# Patient Record
Sex: FEMALE | Race: WHITE | NOT HISPANIC OR LATINO | Employment: PART TIME | URBAN - METROPOLITAN AREA
[De-identification: names, ages, dates, MRNs, and addresses within clinical notes are randomized per-mention and may not be internally consistent; named-entity substitution may affect disease eponyms.]

---

## 2023-06-18 ENCOUNTER — OFFICE VISIT (OUTPATIENT)
Dept: URGENT CARE | Facility: URGENT CARE | Age: 43
End: 2023-06-18
Payer: COMMERCIAL

## 2023-06-18 VITALS
RESPIRATION RATE: 16 BRPM | HEIGHT: 64 IN | HEART RATE: 88 BPM | BODY MASS INDEX: 23.9 KG/M2 | WEIGHT: 140 LBS | TEMPERATURE: 98.4 F | SYSTOLIC BLOOD PRESSURE: 120 MMHG | OXYGEN SATURATION: 100 % | DIASTOLIC BLOOD PRESSURE: 76 MMHG

## 2023-06-18 DIAGNOSIS — N30.01 ACUTE CYSTITIS WITH HEMATURIA: Primary | ICD-10-CM

## 2023-06-18 DIAGNOSIS — B96.89 BACTERIAL VAGINOSIS: ICD-10-CM

## 2023-06-18 DIAGNOSIS — N76.0 BACTERIAL VAGINOSIS: ICD-10-CM

## 2023-06-18 DIAGNOSIS — R10.31 RLQ ABDOMINAL PAIN: ICD-10-CM

## 2023-06-18 DIAGNOSIS — R30.0 DYSURIA: ICD-10-CM

## 2023-06-18 DIAGNOSIS — N89.8 VAGINAL DISCHARGE: ICD-10-CM

## 2023-06-18 LAB
ALBUMIN UR-MCNC: >=300 MG/DL
APPEARANCE UR: ABNORMAL
BILIRUB UR QL STRIP: NEGATIVE
CLUE CELLS: PRESENT
COLOR UR AUTO: YELLOW
GLUCOSE UR STRIP-MCNC: NEGATIVE MG/DL
HGB UR QL STRIP: ABNORMAL
KETONES UR STRIP-MCNC: NEGATIVE MG/DL
LEUKOCYTE ESTERASE UR QL STRIP: ABNORMAL
NITRATE UR QL: NEGATIVE
PH UR STRIP: 7 [PH] (ref 5–7)
RBC #/AREA URNS AUTO: ABNORMAL /HPF
SP GR UR STRIP: 1.02 (ref 1–1.03)
TRICHOMONAS, WET PREP: ABNORMAL
UROBILINOGEN UR STRIP-ACNC: 1 E.U./DL
WBC #/AREA URNS AUTO: ABNORMAL /HPF
WBC CLUMPS #/AREA URNS HPF: PRESENT /HPF
WBC'S/HIGH POWER FIELD, WET PREP: ABNORMAL
YEAST, WET PREP: ABNORMAL

## 2023-06-18 PROCEDURE — 99204 OFFICE O/P NEW MOD 45 MIN: CPT | Performed by: FAMILY MEDICINE

## 2023-06-18 PROCEDURE — 87086 URINE CULTURE/COLONY COUNT: CPT | Performed by: FAMILY MEDICINE

## 2023-06-18 PROCEDURE — 81001 URINALYSIS AUTO W/SCOPE: CPT

## 2023-06-18 PROCEDURE — 87491 CHLMYD TRACH DNA AMP PROBE: CPT | Performed by: FAMILY MEDICINE

## 2023-06-18 PROCEDURE — 87210 SMEAR WET MOUNT SALINE/INK: CPT

## 2023-06-18 PROCEDURE — 87591 N.GONORRHOEAE DNA AMP PROB: CPT | Performed by: FAMILY MEDICINE

## 2023-06-18 RX ORDER — RIZATRIPTAN BENZOATE 10 MG/1
10 TABLET ORAL
COMMUNITY
Start: 2021-09-17 | End: 2024-04-09

## 2023-06-18 RX ORDER — PHENAZOPYRIDINE HYDROCHLORIDE 200 MG/1
200 TABLET, FILM COATED ORAL 3 TIMES DAILY PRN
Qty: 9 TABLET | Refills: 1 | Status: SHIPPED | OUTPATIENT
Start: 2023-06-18 | End: 2024-04-09

## 2023-06-18 RX ORDER — METRONIDAZOLE 500 MG/1
500 TABLET ORAL 2 TIMES DAILY
Qty: 14 TABLET | Refills: 0 | Status: SHIPPED | OUTPATIENT
Start: 2023-06-18 | End: 2023-06-25

## 2023-06-18 ASSESSMENT — PAIN SCALES - GENERAL: PAINLEVEL: SEVERE PAIN (7)

## 2023-06-19 NOTE — PATIENT INSTRUCTIONS
Drink plenty of water.      Go to the emergency room if you develop fevers of 100.4 F or higher/vomiting/kidney pain.      Also go to the emergency room if you develop worsening, severe abdominal pain.      Follow up if not better in 5 days.

## 2023-06-19 NOTE — PROGRESS NOTES
"SUBJECTIVE:   Lilian Dumont is a 43 year old female who  presents today for a possible UTI. Symptoms of burning with urination, a sensation of incomplete voiding and urinary urgency have been going on off-and-on for the past four weeks but worse over the past 2 days.. Hematuria none.  . The urine has been cloudy.  .  There is no history of fever, chills, nausea or vomiting.  No history of vaginal discharge.     She also has noticed two days of gradual-onset RLQ sharp achy pain which has been constant since yesterday (worse today  No history of appendicitis/  No history of kidney stones.      She did a virtual e-visit yesterday and was prescribed Nitrofurantoin for a five-day course.      Past Medical History:    Migraines     Current Outpatient Medications   Medication Sig Dispense Refill     rizatriptan (MAXALT) 10 MG tablet Take 10 mg by mouth       atovaquone-proguanil (MALARONE) 250-100 MG per tablet Take one tablet daily, start 1 day prior to travel to malaria area and continue daily while there and for 7 days after leaving malaria area 22 tablet 0     ciprofloxacin (CIPRO) 500 MG tablet Take one tablet twice a day for up to 3 days as needed for traveler's diarrhea (Patient not taking: Reported on 6/18/2023) 6 tablet 0     CLONAZEPAM PO Take 0.5-1 tablets by mouth as needed. (Patient not taking: Reported on 6/18/2023)       levonorgestrel (MIRENA) 20 MCG/24HR IUD 1 each by Intrauterine route once. (Patient not taking: Reported on 6/18/2023)       Social History     Tobacco Use     Smoking status: Never     Passive exposure: Never     Smokeless tobacco: Never   Vaping Use     Vaping status: Not on file   Substance Use Topics     Alcohol use: Not on file       ROS:   CONSTITUTIONAL:negative for fevers.    :  Positive for dysuria, urinary urgency    OBJECTIVE:  /76   Pulse 88   Temp 98.4  F (36.9  C) (Tympanic)   Resp 16   Ht 1.626 m (5' 4\")   Wt 63.5 kg (140 lb)   LMP 06/14/2023 (Exact Date)   " SpO2 100%   BMI 24.03 kg/m    GENERAL APPEARANCE: healthy, alert and no distress  She is sitting comfortably.    ABDOMEN: soft, normal bowel sounds, there is some tenderness with palpation at a point  inferior and to the right of the umbilicus   No hepatosplenomegaly.  No rebound/guarding.  No distension.    BACK: No CVA tenderness      LABS:    Results for orders placed or performed in visit on 06/18/23   UA Macroscopic with reflex to Microscopic and Culture     Status: Abnormal    Specimen: Urine, Clean Catch   Result Value Ref Range    Color Urine Yellow Colorless, Straw, Light Yellow, Yellow    Appearance Urine Slightly Cloudy (A) Clear    Glucose Urine Negative Negative mg/dL    Bilirubin Urine Negative Negative    Ketones Urine Negative Negative mg/dL    Specific Gravity Urine 1.020 1.003 - 1.035    Blood Urine Large (A) Negative    pH Urine 7.0 5.0 - 7.0    Protein Albumin Urine >=300 (A) Negative mg/dL    Urobilinogen Urine 1.0 0.2, 1.0 E.U./dL    Nitrite Urine Negative Negative    Leukocyte Esterase Urine Large (A) Negative   Urine Microscopic Exam     Status: Abnormal   Result Value Ref Range    RBC Urine 10-25 (A) 0-2 /HPF /HPF    WBC Urine 10-25 (A) 0-5 /HPF /HPF    WBC Clumps Urine Present (A) None Seen /HPF   Wet prep - Clinic Collect     Status: Abnormal    Specimen: Vagina; Swab   Result Value Ref Range    Trichomonas Absent Absent    Yeast Absent Absent    Clue Cells Present (A) Absent    WBCs/high power field 2+ (A) None         ASSESSMENT:     Bacterial Vaginosis    Acute Cystitis with Hematuria    Dysuria    RLQ abdominal pain.  Patient's abdomen was non-emergent on today's exam.  Still cannot rule out appendicitis or ureteral/kidney stones.      PLAN:  For the Acute Cystitis:  Drink plenty of fluids.    Patient was prescribed Nitrofurantoin yesterday through a virtual e-visit .  Continue the entire five-day course of the antibiotic.    Pending labs:  Urine culture   follow up if not better in 5  days.   Go to the emergency room if experiencing fevers of 100.4 F or higher, if having a lot of vomiting or if kidney pain appears.      For the Dysuria:  Rx:  Pyridium    For the bacterial vaginosis:  Rx:  Metronidazole  follow up if not better in 5 days.     For the RLQ Pain:  Pending labs:  Gonorrhea and Chlamydia PCR Urine Tests, Urine culture   Go to the emergency room if you develop fevers of 100.4 F or higher/vomiting/kidney pain.    Also go to the emergency room if you develop worsening, severe abdominal pain.    Follow up if not better in 5 days.      Isauro Hancock MD

## 2023-06-20 LAB
BACTERIA UR CULT: NO GROWTH
C TRACH DNA SPEC QL PROBE+SIG AMP: NEGATIVE
N GONORRHOEA DNA SPEC QL NAA+PROBE: NEGATIVE

## 2023-06-26 ENCOUNTER — NURSE TRIAGE (OUTPATIENT)
Dept: NURSING | Facility: CLINIC | Age: 43
End: 2023-06-26
Payer: COMMERCIAL

## 2023-06-26 NOTE — TELEPHONE ENCOUNTER
Patient was seen on 6/18/2023 and was prescribed Flagyl and pyridium.  Diagnosed with Acute cystitis with hematuria.  Patient is having painful urination still.  Patient denies fever and vomiting.  Patient has no PCP/Clinic.      Reason for Disposition    Taking antibiotic > 3 days for UTI and painful urination not improved    Additional Information    Negative: Shock suspected (e.g., cold/pale/clammy skin, too weak to stand, low BP, rapid pulse)    Negative: Sounds like a life-threatening emergency to the triager    Negative: Unable to urinate (or only a few drops) and bladder feels very full    Negative: Vomiting    Negative: Patient sounds very sick or weak to the triager    Negative: SEVERE pain with urination    Negative: Fever > 100.4 F (38.0 C)    Negative: Side (flank) or lower back pain present    Negative: Taking antibiotic > 24 hours for UTI and fever persists    Protocols used: URINATION PAIN - FEMALE-A-[place]

## 2023-06-30 ENCOUNTER — OFFICE VISIT (OUTPATIENT)
Dept: FAMILY MEDICINE | Facility: CLINIC | Age: 43
End: 2023-06-30
Payer: COMMERCIAL

## 2023-06-30 VITALS
TEMPERATURE: 98.2 F | RESPIRATION RATE: 18 BRPM | SYSTOLIC BLOOD PRESSURE: 101 MMHG | HEIGHT: 65 IN | HEART RATE: 85 BPM | DIASTOLIC BLOOD PRESSURE: 69 MMHG | OXYGEN SATURATION: 97 % | WEIGHT: 139.6 LBS | BODY MASS INDEX: 23.26 KG/M2

## 2023-06-30 DIAGNOSIS — N76.0 BV (BACTERIAL VAGINOSIS): ICD-10-CM

## 2023-06-30 DIAGNOSIS — R30.0 DYSURIA: Primary | ICD-10-CM

## 2023-06-30 DIAGNOSIS — R31.9 HEMATURIA, UNSPECIFIED TYPE: ICD-10-CM

## 2023-06-30 DIAGNOSIS — B96.89 BV (BACTERIAL VAGINOSIS): ICD-10-CM

## 2023-06-30 LAB
ALBUMIN UR-MCNC: NEGATIVE MG/DL
APPEARANCE UR: CLEAR
BACTERIA #/AREA URNS HPF: ABNORMAL /HPF
BILIRUB UR QL STRIP: NEGATIVE
CLUE CELLS: PRESENT
COLOR UR AUTO: YELLOW
GLUCOSE UR STRIP-MCNC: NEGATIVE MG/DL
HGB UR QL STRIP: ABNORMAL
KETONES UR STRIP-MCNC: ABNORMAL MG/DL
LEUKOCYTE ESTERASE UR QL STRIP: NEGATIVE
NITRATE UR QL: NEGATIVE
PH UR STRIP: 7 [PH] (ref 5–8)
RBC #/AREA URNS AUTO: ABNORMAL /HPF
SP GR UR STRIP: 1.01 (ref 1–1.03)
SQUAMOUS #/AREA URNS AUTO: ABNORMAL /LPF
TRICHOMONAS, WET PREP: ABNORMAL
UROBILINOGEN UR STRIP-ACNC: 0.2 E.U./DL
WBC #/AREA URNS AUTO: ABNORMAL /HPF
WBC'S/HIGH POWER FIELD, WET PREP: ABNORMAL
YEAST, WET PREP: ABNORMAL

## 2023-06-30 PROCEDURE — 81001 URINALYSIS AUTO W/SCOPE: CPT | Performed by: FAMILY MEDICINE

## 2023-06-30 PROCEDURE — 87210 SMEAR WET MOUNT SALINE/INK: CPT | Performed by: FAMILY MEDICINE

## 2023-06-30 PROCEDURE — 99213 OFFICE O/P EST LOW 20 MIN: CPT | Performed by: FAMILY MEDICINE

## 2023-06-30 RX ORDER — METRONIDAZOLE 7.5 MG/G
1 GEL VAGINAL DAILY
Qty: 25 G | Refills: 0 | Status: SHIPPED | OUTPATIENT
Start: 2023-06-30 | End: 2023-07-05

## 2023-06-30 ASSESSMENT — ANXIETY QUESTIONNAIRES
2. NOT BEING ABLE TO STOP OR CONTROL WORRYING: SEVERAL DAYS
GAD7 TOTAL SCORE: 4
3. WORRYING TOO MUCH ABOUT DIFFERENT THINGS: SEVERAL DAYS
7. FEELING AFRAID AS IF SOMETHING AWFUL MIGHT HAPPEN: NOT AT ALL
GAD7 TOTAL SCORE: 4
IF YOU CHECKED OFF ANY PROBLEMS ON THIS QUESTIONNAIRE, HOW DIFFICULT HAVE THESE PROBLEMS MADE IT FOR YOU TO DO YOUR WORK, TAKE CARE OF THINGS AT HOME, OR GET ALONG WITH OTHER PEOPLE: NOT DIFFICULT AT ALL
4. TROUBLE RELAXING: SEVERAL DAYS
GAD7 TOTAL SCORE: 4
8. IF YOU CHECKED OFF ANY PROBLEMS, HOW DIFFICULT HAVE THESE MADE IT FOR YOU TO DO YOUR WORK, TAKE CARE OF THINGS AT HOME, OR GET ALONG WITH OTHER PEOPLE?: NOT DIFFICULT AT ALL
6. BECOMING EASILY ANNOYED OR IRRITABLE: NOT AT ALL
7. FEELING AFRAID AS IF SOMETHING AWFUL MIGHT HAPPEN: NOT AT ALL
1. FEELING NERVOUS, ANXIOUS, OR ON EDGE: SEVERAL DAYS
5. BEING SO RESTLESS THAT IT IS HARD TO SIT STILL: NOT AT ALL

## 2023-06-30 NOTE — PATIENT INSTRUCTIONS
Please come back (to one of the [facility]) in about 2-3 weeks to do a repeat random urine test to make sure blood is gone    If you have another episode of blood in your urine, come back and do another test

## 2023-06-30 NOTE — PROGRESS NOTES
Assessment & Plan     Dysuria  - UA Macroscopic with reflex to Microscopic and Culture - much improved from last, though still with some RBCs. Note that last UA showed 10-25 cells /hpf both red cells and white cells, plus WBC clumps and no bacteria  . Culture was negative but she had taken one pill of nitrofurantion before leaving urine specimen  - Wet prep - Clinic Collect    Hematuria, unspecified type  I would like to recheck in 2-3 weeks, and have one for future just in case  - UA Macroscopic with reflex to Microscopic and Culture    BV (bacterial vaginosis)  - metroNIDAZOLE (METROGEL) 0.75 % vaginal gel  Dispense: 25 g; Refill: 0    Patient Instructions   Please come back (to one of the [facility]) in about 2-3 weeks to do a repeat random urine test to make sure blood is gone    If you have another episode of blood in your urine, come back and do another test    Return if symptoms worsen or fail to improve.        Lolly De Leon MD  [facility]   Lilian is a 43 year old, presenting for the following health issues:  Recheck Medication and estblish care (Pt is here to est care, pr was diagnosed with a UTI 6/18/23, pt reports that the symptoms are still there and it has improved but pt is still experiencing some symptoms.)        6/30/2023     3:15 PM   Additional Questions   Roomed by brendon santiago   Accompanied by alone         6/30/2023     3:15 PM   Patient Reported Additional Medications   Patient reports taking the following new medications none     History of Present Illness       Mental Health Follow-up:  Patient presents to follow-up on Depression & Anxiety.Patient's depression since last visit has been:  Better  The patient is not having other symptoms associated with depression.  Patient's anxiety since last visit has been:  Better  The patient is not having other symptoms associated with anxiety.  Any significant life events: No  Patient is not feeling  "anxious or having panic attacks.  Patient has no concerns about alcohol or drug use.    Headaches:   Since the patient's last clinic visit, headaches are: no change  The patient is getting headaches:  Once or twice per month  She is not able to do normal daily activities when she has a migraine.  The patient is taking the following rescue/relief medications:  Maxalt   Patient states \"I get total relief\" from the rescue/relief medications.   The patient is taking the following medications to prevent migraines:  No medications to prevent migraines  In the past 4 weeks, the patient has gone to an Urgent Care or Emergency Room 0 times times due to headaches.    She eats 0-1 servings of fruits and vegetables daily.She consumes 1 sweetened beverage(s) daily.She exercises with enough effort to increase her heart rate 9 or less minutes per day.  She exercises with enough effort to increase her heart rate 3 or less days per week.   She is taking medications regularly.  Today's LAURA-7 Score: 4     Lilian had a virtual visit in a different system on 6/17/18 for UTI symptoms and was prescribed nitrofurantoin.  She picked it up the morning of 6/18/23 and took 1 pill, but felt it would be appropriate to be seen in person.  Her UA was notable for white blood cells red blood cells and white blood cell clumps without bacteria.  She was also noted to have clue cells on her wet prep.  She was advised to continue the nitrofurantoin and take metronidazole pills for the bacterial vaginosis.    She is feeling better but comes today because she is still having symptoms when she urinates - end of urination - burning and tingling  No back pain  No new body products  No fever  No nausea        Objective    /69 (BP Location: Right arm, Patient Position: Sitting, Cuff Size: Adult Regular)   Pulse 85   Temp 98.2  F (36.8  C) (Tympanic)   Resp 18   Ht 1.651 m (5' 5\")   Wt 63.3 kg (139 lb 9.6 oz)   LMP 06/14/2023 (Exact Date)   SpO2 " 97%   Breastfeeding No   BMI 23.23 kg/m    Body mass index is 23.23 kg/m .  Physical Exam   GENERAL APPEARANCE: healthy, alert and no distress  PSYCH: mentation appears normal and affect normal/bright    She did opt to self swab rather than having a pelvic exam

## 2023-08-31 ENCOUNTER — OFFICE VISIT (OUTPATIENT)
Dept: FAMILY MEDICINE | Facility: CLINIC | Age: 43
End: 2023-08-31
Payer: COMMERCIAL

## 2023-08-31 VITALS
SYSTOLIC BLOOD PRESSURE: 100 MMHG | BODY MASS INDEX: 23.17 KG/M2 | HEART RATE: 79 BPM | DIASTOLIC BLOOD PRESSURE: 64 MMHG | HEIGHT: 65 IN | WEIGHT: 139.1 LBS | OXYGEN SATURATION: 98 % | TEMPERATURE: 98.8 F

## 2023-08-31 DIAGNOSIS — Z12.4 CERVICAL CANCER SCREENING: ICD-10-CM

## 2023-08-31 DIAGNOSIS — R53.83 OTHER FATIGUE: ICD-10-CM

## 2023-08-31 DIAGNOSIS — Z13.220 SCREENING, LIPID: ICD-10-CM

## 2023-08-31 DIAGNOSIS — R30.0 DYSURIA: Primary | ICD-10-CM

## 2023-08-31 DIAGNOSIS — R31.9 HEMATURIA, UNSPECIFIED TYPE: ICD-10-CM

## 2023-08-31 DIAGNOSIS — Z11.59 NEED FOR HEPATITIS C SCREENING TEST: ICD-10-CM

## 2023-08-31 DIAGNOSIS — Z13.1 SCREENING FOR DIABETES MELLITUS: ICD-10-CM

## 2023-08-31 LAB
ALBUMIN UR-MCNC: NEGATIVE MG/DL
APPEARANCE UR: CLEAR
BACTERIA #/AREA URNS HPF: ABNORMAL /HPF
BILIRUB UR QL STRIP: NEGATIVE
CLUE CELLS: ABNORMAL
COLOR UR AUTO: YELLOW
GLUCOSE UR STRIP-MCNC: NEGATIVE MG/DL
HGB UR QL STRIP: ABNORMAL
KETONES UR STRIP-MCNC: NEGATIVE MG/DL
LEUKOCYTE ESTERASE UR QL STRIP: NEGATIVE
NITRATE UR QL: NEGATIVE
PH UR STRIP: 6 [PH] (ref 5–8)
RBC #/AREA URNS AUTO: ABNORMAL /HPF
SP GR UR STRIP: <=1.005 (ref 1–1.03)
SQUAMOUS #/AREA URNS AUTO: ABNORMAL /LPF
TRICHOMONAS, WET PREP: ABNORMAL
UROBILINOGEN UR STRIP-ACNC: 0.2 E.U./DL
WBC #/AREA URNS AUTO: ABNORMAL /HPF
WBC'S/HIGH POWER FIELD, WET PREP: ABNORMAL
YEAST, WET PREP: ABNORMAL

## 2023-08-31 PROCEDURE — 87210 SMEAR WET MOUNT SALINE/INK: CPT | Performed by: FAMILY MEDICINE

## 2023-08-31 PROCEDURE — 87624 HPV HI-RISK TYP POOLED RSLT: CPT | Performed by: FAMILY MEDICINE

## 2023-08-31 PROCEDURE — 99213 OFFICE O/P EST LOW 20 MIN: CPT | Performed by: FAMILY MEDICINE

## 2023-08-31 PROCEDURE — 81001 URINALYSIS AUTO W/SCOPE: CPT | Performed by: FAMILY MEDICINE

## 2023-08-31 PROCEDURE — G0145 SCR C/V CYTO,THINLAYER,RESCR: HCPCS | Performed by: FAMILY MEDICINE

## 2023-08-31 NOTE — PROGRESS NOTES
Assessment & Plan     Dysuria  - Wet prep - Clinic Collect: White cells without yeast or bacteria.  Discussed possibility of irritants.    Hematuria, unspecified type  - UA Macroscopic with reflex to Microscopic and Culture - Lab Collect  - UA Microscopic with Reflex to Culture    UA RESULTS:  Recent Labs   Lab Test 08/31/23  1551   COLOR Yellow   APPEARANCE Clear   URINEGLC Negative   URINEBILI Negative   URINEKETONE Negative   SG <=1.005   UBLD Trace*   URINEPH 6.0   PROTEIN Negative   UROBILINOGEN 0.2   NITRITE Negative   LEUKEST Negative   RBCU 0-2   WBCU None Seen        Other fatigue  Lilian only gets 6 hours of sleep on average night.  Discussed she should aim for 7 or 8.  Nonetheless we will check for other causes of fatigue:  - Ferritin  - CBC with platelets    PREVENTIVE    Need for hepatitis C screening test  - Hepatitis C Screen Reflex to HCV RNA Quant and Genotype    Cervical cancer screening  - Pap screen with HPV - recommended age 30 - 65 years  - HPV Hold (Lab Only)    Screening, lipid  - Lipid panel reflex to direct LDL Fasting - future    Screening for diabetes mellitus  - Glucose - future    Return for as needed , pending lab results.      Lolly De Leon MD  [facility]    Subjective   Lilian is a 43 year old, presenting for the following health issues:  office visit and Recheck Medication (Pt reports that she is here for follow per Urgent Care visit, possible lab draw. Pt also reports that she is still feeling some burning and tingling when urinating, also has an smell,odor.)      8/31/2023     3:00 PM   Additional Questions   Roomed by jack   Accompanied by alone         8/31/2023     3:00 PM   Patient Reported Additional Medications   Patient reports taking the following new medications none       HPI     Pt reports that she is here for follow up from Urgent care Visit for possible UTI and would like labs drawn.    Last visit with me 6/30/23:    Dysuria  - UA  "Macroscopic with reflex to Microscopic and Culture - much improved from last, though still with some RBCs. Note that last UA showed 10-25 cells /hpf both red cells and white cells, plus WBC clumps and no bacteria  . Culture was negative but she had taken one pill of nitrofurantion before leaving urine specimen  - Wet prep - Clinic Collect     Hematuria, unspecified type  I would like to recheck in 2-3 weeks, and have one for future just in case  - UA Macroscopic with reflex to Microscopic and Culture     BV (bacterial vaginosis)  - metroNIDAZOLE (METROGEL) 0.75 % vaginal gel  Dispense: 25 g; Refill: 0    Lilian had not come [place] for urine recheck so we will do that today    She says vaginitis is gone      She is low energy lately   - normally not a great sleep - 6 hours per night   - always has a little depression and anxiety  - manageable, she does not feel medications are necessary   - started taking \"floravida \"- this is ferrous gluconate 10 mg - I reviewed that would be considered too low dose if she were treating anemia   - not gaining or losing weight    Preventive   - Had a pelvic exam at [facility]- not sure if this included a Pap smear - she called them and it did not   - she is OK for recommended  Hep C screening (had HIV screening elsewhere)  - also interested in lipid panel and diabetes check - she can come back fasting        Objective    /64 (BP Location: Left arm, Patient Position: Sitting, Cuff Size: Adult Regular)   Pulse 79   Temp 98.8  F (37.1  C) (Tympanic)   Ht 1.651 m (5' 5\")   Wt 63.1 kg (139 lb 1.6 oz)   LMP 07/28/2023 (Approximate)   SpO2 98%   Breastfeeding No   BMI 23.15 kg/m    Body mass index is 23.15 kg/m .  Physical Exam   General appearance - alert, well appearing, and in no distress  Mental status - normal mood, behavior, speech, dress, motor activity, and thought processes  Pelvic - VULVA: normal appearing vulva with no masses, tenderness or lesions, VAGINA: normal " appearing vagina with normal color and discharge, no lesions, vaginal discharge - white and scant, CERVIX: normal appearing cervix without discharge or lesions

## 2023-09-05 LAB
BKR LAB AP GYN ADEQUACY: NORMAL
BKR LAB AP GYN INTERPRETATION: NORMAL
BKR LAB AP HPV REFLEX: NORMAL
BKR LAB AP LMP: NORMAL
BKR LAB AP PREVIOUS ABNORMAL: NORMAL
PATH REPORT.COMMENTS IMP SPEC: NORMAL
PATH REPORT.COMMENTS IMP SPEC: NORMAL
PATH REPORT.RELEVANT HX SPEC: NORMAL

## 2023-09-07 PROBLEM — R87.610 ASCUS WITH POSITIVE HIGH RISK HPV CERVICAL: Status: ACTIVE | Noted: 2023-09-07

## 2023-09-07 PROBLEM — R87.810 ASCUS WITH POSITIVE HIGH RISK HPV CERVICAL: Status: ACTIVE | Noted: 2023-09-07

## 2023-09-07 LAB
HUMAN PAPILLOMA VIRUS 16 DNA: NEGATIVE
HUMAN PAPILLOMA VIRUS 18 DNA: NEGATIVE
HUMAN PAPILLOMA VIRUS FINAL DIAGNOSIS: NORMAL
HUMAN PAPILLOMA VIRUS OTHER HR: NEGATIVE

## 2023-09-11 ENCOUNTER — LAB (OUTPATIENT)
Dept: LAB | Facility: CLINIC | Age: 43
End: 2023-09-11
Payer: COMMERCIAL

## 2023-09-11 DIAGNOSIS — R53.83 OTHER FATIGUE: ICD-10-CM

## 2023-09-11 DIAGNOSIS — Z11.59 NEED FOR HEPATITIS C SCREENING TEST: ICD-10-CM

## 2023-09-11 DIAGNOSIS — Z13.220 SCREENING, LIPID: ICD-10-CM

## 2023-09-11 DIAGNOSIS — Z13.1 SCREENING FOR DIABETES MELLITUS: ICD-10-CM

## 2023-09-11 DIAGNOSIS — Z11.4 SCREENING FOR HIV (HUMAN IMMUNODEFICIENCY VIRUS): Primary | ICD-10-CM

## 2023-09-11 LAB
CHOLEST SERPL-MCNC: 209 MG/DL
ERYTHROCYTE [DISTWIDTH] IN BLOOD BY AUTOMATED COUNT: 11.6 % (ref 10–15)
FASTING STATUS PATIENT QL REPORTED: YES
FERRITIN SERPL-MCNC: 56 NG/ML (ref 6–175)
GLUCOSE SERPL-MCNC: 91 MG/DL (ref 70–99)
HCT VFR BLD AUTO: 39.9 % (ref 35–47)
HCV AB SERPL QL IA: NONREACTIVE
HDLC SERPL-MCNC: 66 MG/DL
HGB BLD-MCNC: 13.4 G/DL (ref 11.7–15.7)
LDLC SERPL CALC-MCNC: 134 MG/DL
MCH RBC QN AUTO: 30.6 PG (ref 26.5–33)
MCHC RBC AUTO-ENTMCNC: 33.6 G/DL (ref 31.5–36.5)
MCV RBC AUTO: 91 FL (ref 78–100)
NONHDLC SERPL-MCNC: 143 MG/DL
PLATELET # BLD AUTO: 268 10E3/UL (ref 150–450)
RBC # BLD AUTO: 4.38 10E6/UL (ref 3.8–5.2)
TRIGL SERPL-MCNC: 44 MG/DL
WBC # BLD AUTO: 4.3 10E3/UL (ref 4–11)

## 2023-09-11 PROCEDURE — 87389 HIV-1 AG W/HIV-1&-2 AB AG IA: CPT

## 2023-09-11 PROCEDURE — 36415 COLL VENOUS BLD VENIPUNCTURE: CPT

## 2023-09-11 PROCEDURE — 82728 ASSAY OF FERRITIN: CPT

## 2023-09-11 PROCEDURE — 82947 ASSAY GLUCOSE BLOOD QUANT: CPT

## 2023-09-11 PROCEDURE — 86803 HEPATITIS C AB TEST: CPT

## 2023-09-11 PROCEDURE — 80061 LIPID PANEL: CPT

## 2023-09-11 PROCEDURE — 85027 COMPLETE CBC AUTOMATED: CPT

## 2023-09-12 LAB — HIV 1+2 AB+HIV1 P24 AG SERPL QL IA: NONREACTIVE

## 2024-04-09 ENCOUNTER — OFFICE VISIT (OUTPATIENT)
Dept: FAMILY MEDICINE | Facility: CLINIC | Age: 44
End: 2024-04-09
Payer: COMMERCIAL

## 2024-04-09 VITALS
OXYGEN SATURATION: 98 % | DIASTOLIC BLOOD PRESSURE: 68 MMHG | BODY MASS INDEX: 23.76 KG/M2 | HEIGHT: 65 IN | TEMPERATURE: 97.7 F | HEART RATE: 63 BPM | SYSTOLIC BLOOD PRESSURE: 105 MMHG | WEIGHT: 142.6 LBS | RESPIRATION RATE: 18 BRPM

## 2024-04-09 DIAGNOSIS — R21 RASH AND NONSPECIFIC SKIN ERUPTION: Primary | ICD-10-CM

## 2024-04-09 RX ORDER — LORATADINE 10 MG/1
10 TABLET ORAL DAILY
Qty: 30 TABLET | Refills: 1 | Status: SHIPPED | OUTPATIENT
Start: 2024-04-09 | End: 2024-04-09

## 2024-04-09 RX ORDER — LORATADINE 10 MG/1
10 TABLET ORAL DAILY
Qty: 30 TABLET | Refills: 1 | Status: SHIPPED | OUTPATIENT
Start: 2024-04-09

## 2024-04-09 ASSESSMENT — ENCOUNTER SYMPTOMS
CHILLS: 0
FEVER: 0
FATIGUE: 0

## 2024-04-09 ASSESSMENT — PAIN SCALES - GENERAL: PAINLEVEL: NO PAIN (0)

## 2024-04-09 NOTE — PATIENT INSTRUCTIONS
- Keep diary to identify potential trigger/allergen  - Use moisturizer such as Vaseline/Aquaphor 2x/day

## 2024-04-09 NOTE — PROGRESS NOTES
"Today's Date: Apr 9, 2024     Patient Lilian Dumont 1980 presents to the clinic today to address   Chief Complaint   Patient presents with    Derm Problem     Has been about a month, rash on face, chest, back and head  [facility] and got treatment, steroids and antibiotic, that seemed to help but it is not gone completely             SUBJECTIVE     History of Present Illness:    44-year-old female presents to clinic to discuss chronic rash.  Patient endorses international travel, she went to [place] in late January/2024.  She endorses obtaining a flulike illness while she was traveling.  She returned from [place] on 2/6/2024.  She then developed a rash on 2/23/2024.  The rash was on her chest, back of head, arms, and face.  Associated symptoms include itching.  She went to an urgent care on 3/15/2024 where she was diagnosed with atopic dermatitis and prescribed cephalexin and 12-day prednisone taper.    Today, patient reports that the rash is improved after taking those medications, however, it has not resolved completely.  The symptoms wax and wane.  Patient reports that she took a shower yesterday which made her symptoms worse.  Today the symptoms are better.  She has had \"breakouts\" due to make-up in the past.  She also uses face oils and lotions but not on daily basis.  She denies new soaps or detergents.  She typically uses a Dr. Salina soap.  She denies systemic symptoms such as fevers, chills, or fatigue.  She denies new sexual partners. No other acute concerns/symptoms at time of exam.        Review of Systems   Constitutional:  Negative for chills, fatigue and fever.   Skin:  Positive for rash.   Constitutional, HEENT, cardiovascular, pulmonary, gi and gu systems are negative, except as otherwise noted.      Allergies   Allergen Reactions    Food Other (See Comments)     [place]- tiny blisters around mouth   CATS        No current outpatient medications on file.     No " "current facility-administered medications for this visit.         Past Medical History:   Diagnosis Date    Anxiety     Depressive disorder         History reviewed. No pertinent family history.     Social History     Tobacco Use    Smoking status: Never     Passive exposure: Never    Smokeless tobacco: Never   Vaping Use    Vaping Use: Never used        History   Sexual Activity    Sexual activity: Not on file             No data to display                 Immunization History   Administered Date(s) Administered    COVID-19 12+ (2023-24) (Pfizer) 01/18/2024    COVID-19 MONOVALENT 12+ (Pfizer) 08/06/2021    COVID-19 Vaccine (Akbar) 03/05/2021    Typhoid IM 05/02/2013                 OBJECTIVE     /68 (BP Location: Left arm, Patient Position: Sitting, Cuff Size: Adult Regular)   Pulse 63   Temp 97.7  F (36.5  C) (Oral)   Resp 18   Ht 1.654 m (5' 5.1\")   Wt 64.7 kg (142 lb 9.6 oz)   SpO2 98%   BMI 23.66 kg/m       Labs:  Lab Results   Component Value Date    WBC 4.3 09/11/2023    HGB 13.4 09/11/2023    HCT 39.9 09/11/2023     09/11/2023    CHOL 209 (H) 09/11/2023    TRIG 44 09/11/2023    HDL 66 09/11/2023        Physical Exam  Constitutional:       General: She is not in acute distress.     Appearance: She is not ill-appearing.   Eyes:      Extraocular Movements: Extraocular movements intact.      Pupils: Pupils are equal, round, and reactive to light.   Cardiovascular:      Rate and Rhythm: Normal rate and regular rhythm.      Heart sounds: Normal heart sounds. No murmur heard.  Pulmonary:      Effort: Pulmonary effort is normal. No respiratory distress.      Breath sounds: Normal breath sounds. No wheezing or rales.   Abdominal:      General: Bowel sounds are normal.      Palpations: Abdomen is soft.      Tenderness: There is no abdominal tenderness.   Musculoskeletal:      Cervical back: Neck supple.      Right lower leg: No edema.      Left lower leg: No edema.   Lymphadenopathy:      " Cervical: No cervical adenopathy.   Skin:     Coloration: Skin is not jaundiced.      Findings: Rash present. No abscess or erythema. Rash is papular. Rash is not purpuric, pustular or vesicular.             Comments: Scant papules as noted above. No burrows, serpentine, or interdigital lesions noted.    Neurological:      General: No focal deficit present.      Mental Status: She is alert.   Psychiatric:         Thought Content: Thought content normal.         Judgment: Judgment normal.               ASSESSMENT/PLAN     1. Rash and nonspecific skin eruption    This is a pleasant 44-year-old female who presents to clinic to discuss chronic rash since 2/23/2024.  The rash was on her chest, back of head, arms, and face.  Associated symptoms include itching.  She went to urgent care on 3/15/2024 where she was prescribed cephalexin and a 12-day prednisone taper which improved her symptoms, however, they have not resolved completely.  Her symptoms wax and wane.  She reports that the rash/itching increased after she took a shower yesterday.  She denies new soaps, detergents, or new sexual partners.    Patient is currently afebrile and in no acute distress.  Physical exam demonstrates scant papules as noted above.  Low suspicion for scabies in the absence of burrows, serpentine lesions, or interdigital lesions.  Low suspicion for infection given the absence of fevers or purpura. Low suspicion for STI in absence of new sexual partners. Will hold off on labs considering afebrile and no jaundice on exam.    Will place referral to allergy for patch testing.  In the interim, advised skin moisturizer such as Aquaphor or Vaseline twice daily and will start loratadine for symptomatic relief of itching.  Patient was encouraged to try Dove sensitive soap/shampoo and/or baby shampoo.  She is also encouraged to keep a diary to identify any potential triggers/allergens.    - Adult Allergy/Asthma  Referral; Future  - loratadine  (CLARITIN) 10 MG tablet; Take 1 tablet (10 mg) by mouth daily  Dispense: 30 tablet; Refill: 1          Follow-Up:  - Follow up in as needed, or if symptoms worsen or fail to improve.     Options for treatment and follow-up care were reviewed with the patient. Patient engaged in the decision making process and verbalized understanding of the options discussed and agreed with the final plan.  AVS printed and given to patient.    LATOYA Smith [facility]  Nurse Practitioners [address] [id number] [phone]        Note: Chart documentation was done in part with Dragon Voice Recognition software.  Although reviewed after completion, some word and grammatical errors may remain. Please contact author for any clarification or concerns.

## 2024-04-11 ENCOUNTER — TELEPHONE (OUTPATIENT)
Dept: FAMILY MEDICINE | Facility: CLINIC | Age: 44
End: 2024-04-11

## 2024-04-11 DIAGNOSIS — R21 RASH AND NONSPECIFIC SKIN ERUPTION: Primary | ICD-10-CM

## 2024-04-11 NOTE — TELEPHONE ENCOUNTER
Provider asked for us to reach out to the patient to let her know the allergy clinic will not see her unless she is evaluated by Derm first. Provider wanted to know if she wanted a referral. Patient stated that she does.

## 2024-04-11 NOTE — TELEPHONE ENCOUNTER
Sent referral to dermatology and called patient to let her know that the referral was placed and the number to call.   Danisha PENNINGTON EMT 3:12 PM 4/11/2024

## 2024-08-26 ENCOUNTER — VIRTUAL VISIT (OUTPATIENT)
Dept: FAMILY MEDICINE | Facility: CLINIC | Age: 44
End: 2024-08-26
Payer: COMMERCIAL

## 2024-08-26 DIAGNOSIS — G43.009 MIGRAINE WITHOUT AURA AND WITHOUT STATUS MIGRAINOSUS, NOT INTRACTABLE: Primary | ICD-10-CM

## 2024-08-26 PROCEDURE — 99213 OFFICE O/P EST LOW 20 MIN: CPT | Mod: 95 | Performed by: NURSE PRACTITIONER

## 2024-08-26 RX ORDER — RIZATRIPTAN BENZOATE 10 MG/1
10 TABLET ORAL
Qty: 10 TABLET | Refills: 4 | Status: SHIPPED | OUTPATIENT
Start: 2024-08-26

## 2024-08-26 RX ORDER — RIZATRIPTAN BENZOATE 10 MG/1
10 TABLET ORAL
COMMUNITY
End: 2024-08-26

## 2024-08-26 NOTE — PROGRESS NOTES
Lilian is a 44 year old who is being evaluated via a billable video visit.    MY CHART  How would you like to obtain your AVS? MyChart  If the video visit is dropped, the invitation should be resent by: Text to cell phone: [phone]  Will anyone else be joining your video visit? No      Assessment & Plan     Migraine without aura and without status migrainosus, not intractable  Chronic, good results maxalt.  Using infrequently ~3x a month.  Continue as needed.   - rizatriptan (MAXALT) 10 MG tablet; Take 1 tablet (10 mg) by mouth at onset of headache for migraine.        Follow-up in 6 mos physical    Subjective   Lilian is a 44 year old, presenting for the following health issues:  Video Visit and Migraine Mgmt      Video Start Time:  2:35 pm    HPI     Migraine   Since your last clinic visit, how have your headaches changed?  No change  How often are you getting headaches or migraines? At least once a month    Are you able to do normal daily activities when you have a migraine? No  Are you taking rescue/relief medications? (Select all that apply) Maxalt  How helpful is your rescue/relief medication?  I get total relief  Are you taking any medications to prevent migraines? (Select all that apply)  No  In the past 4 weeks, how often have you gone to urgent care or the emergency room because of your headaches?  0  How many servings of fruits and vegetables do you eat daily?  0-1  On average, how many sweetened beverages do you drink each day (Examples: soda, juice, sweet tea, etc.  Do NOT count diet or artificially sweetened beverages)?   1  How many days per week do you exercise enough to make your heart beat faster? 3 or less  How many minutes a day do you exercise enough to make your heart beat faster? 20 - 29  How many days per week do you miss taking your medication? 0  Hx of migraines since age 20.   Has found maxalt to be very effective.               Objective    Vitals - Patient Reported  Weight (Patient  Reported): 63.5 kg (140 lb)  Pain Score: Moderate Pain (4)  Pain Loc:  (jaw pain)      Vitals:  No vitals were obtained today due to virtual visit.    Physical Exam   GENERAL: alert and no distress  EYES: Eyes grossly normal to inspection.  No discharge or erythema, or obvious scleral/conjunctival abnormalities.  RESP: No audible wheeze, cough, or visible cyanosis.    SKIN: Visible skin clear. No significant rash, abnormal pigmentation or lesions.  NEURO: Cranial nerves grossly intact.  Mentation and speech appropriate for age.  PSYCH: Appropriate affect, tone, and pace of words    No results found for this or any previous visit (from the past 24 hour(s)).      Video-Visit Details    Type of service:  Video Visit   Video End Time:2:40 PM  Originating Location (pt. Location): Home    Distant Location (provider location):  Off-site  Platform used for Video Visit: Marvin  Signed Electronically by: LATOYA Cramer CNP

## 2024-09-15 ENCOUNTER — HEALTH MAINTENANCE LETTER (OUTPATIENT)
Age: 44
End: 2024-09-15

## 2025-06-01 ENCOUNTER — HEALTH MAINTENANCE LETTER (OUTPATIENT)
Age: 45
End: 2025-06-01